# Patient Record
Sex: MALE | Race: OTHER | URBAN - METROPOLITAN AREA
[De-identification: names, ages, dates, MRNs, and addresses within clinical notes are randomized per-mention and may not be internally consistent; named-entity substitution may affect disease eponyms.]

---

## 2022-03-01 ENCOUNTER — OFFICE VISIT (OUTPATIENT)
Dept: FAMILY MEDICINE CLINIC | Facility: CLINIC | Age: 42
End: 2022-03-01
Payer: COMMERCIAL

## 2022-03-01 VITALS
HEIGHT: 62 IN | DIASTOLIC BLOOD PRESSURE: 74 MMHG | TEMPERATURE: 98 F | BODY MASS INDEX: 24.11 KG/M2 | SYSTOLIC BLOOD PRESSURE: 129 MMHG | HEART RATE: 76 BPM | WEIGHT: 131 LBS

## 2022-03-01 DIAGNOSIS — Z23 NEED FOR TDAP VACCINATION: ICD-10-CM

## 2022-03-01 DIAGNOSIS — Z00.00 WELL ADULT EXAM: Primary | ICD-10-CM

## 2022-03-01 PROCEDURE — 99385 PREV VISIT NEW AGE 18-39: CPT | Performed by: FAMILY MEDICINE

## 2022-03-01 PROCEDURE — 90471 IMMUNIZATION ADMIN: CPT | Performed by: FAMILY MEDICINE

## 2022-03-01 PROCEDURE — 3078F DIAST BP <80 MM HG: CPT | Performed by: FAMILY MEDICINE

## 2022-03-01 PROCEDURE — 3074F SYST BP LT 130 MM HG: CPT | Performed by: FAMILY MEDICINE

## 2022-03-01 PROCEDURE — 3008F BODY MASS INDEX DOCD: CPT | Performed by: FAMILY MEDICINE

## 2022-03-01 PROCEDURE — 90715 TDAP VACCINE 7 YRS/> IM: CPT | Performed by: FAMILY MEDICINE

## 2022-08-01 ENCOUNTER — TELEPHONE (OUTPATIENT)
Dept: FAMILY MEDICINE CLINIC | Facility: CLINIC | Age: 42
End: 2022-08-01

## 2022-08-01 NOTE — TELEPHONE ENCOUNTER
Mailed lab reminder letter to pt. Letter came back- unable to deliver due to wrong address per post office. Please update pt's address once he comes back.

## 2024-04-22 ENCOUNTER — OFFICE VISIT (OUTPATIENT)
Dept: FAMILY MEDICINE CLINIC | Facility: CLINIC | Age: 44
End: 2024-04-22

## 2024-04-22 VITALS
DIASTOLIC BLOOD PRESSURE: 82 MMHG | HEART RATE: 93 BPM | OXYGEN SATURATION: 96 % | WEIGHT: 131 LBS | HEIGHT: 63 IN | SYSTOLIC BLOOD PRESSURE: 118 MMHG | TEMPERATURE: 98 F | RESPIRATION RATE: 16 BRPM | BODY MASS INDEX: 23.21 KG/M2

## 2024-04-22 DIAGNOSIS — J20.9 ACUTE BRONCHITIS, UNSPECIFIED ORGANISM: Primary | ICD-10-CM

## 2024-04-22 RX ORDER — BENZONATATE 200 MG/1
200 CAPSULE ORAL 3 TIMES DAILY PRN
Qty: 15 CAPSULE | Refills: 0 | Status: SHIPPED | OUTPATIENT
Start: 2024-04-22 | End: 2024-04-27

## 2024-04-22 NOTE — PROGRESS NOTES
CHIEF COMPLAINT:     Chief Complaint   Patient presents with    Cough     Cough x Fri, worsened by Sat, productive, felt warm at night did not check temp, cough causing HA and chest soreness    OTC Delsym w no relief          HPI:   Osmel Stanley is a 43 year old male who presents for cough for  4  days.   Cough is productive and is worse at night. Cough is interfering with sleep.   Home treatments include: Delsym without relief.   Associated symptoms include: nasal congestion, headache, no fever, no chills, no sore throat, + sinus congestion, no shortness of breath, no wheezing, no chest pain.   No known exposures. Kids with similar symptoms.       Current Outpatient Medications   Medication Sig Dispense Refill    benzonatate 200 MG Oral Cap Take 1 capsule (200 mg total) by mouth 3 (three) times daily as needed for cough. 15 capsule 0    fluocinonide 0.05 % External Cream Apply 1 Application topically 2 (two) times daily. APPLY TO AFFECTED AREA        History reviewed. No pertinent past medical history.   Social History:  Social History     Socioeconomic History    Marital status: Single   Tobacco Use    Smoking status: Never    Smokeless tobacco: Never   Vaping Use    Vaping status: Never Used   Substance and Sexual Activity    Alcohol use: Never    Drug use: Never        REVIEW OF SYSTEMS:   GENERAL: See HPI  SKIN: No rashes, or other skin lesions.   EYES: Denies blurred vision or double vision.  HENT: Denies ear pain or decreased hearing.  See HPI  CARDIOVASCULAR: Denies chest pain or palpitations  LUNGS: Per HPI.    GI: Denies N/V/C/D or abdominal pain.      EXAM:   /82   Pulse 93   Temp 98.3 °F (36.8 °C)   Resp 16   Ht 5' 3\" (1.6 m)   Wt 131 lb (59.4 kg)   SpO2 96%   BMI 23.21 kg/m²   GENERAL: well developed, well nourished,in no apparent distress  SKIN: no rashes, no suspicious lesions  EYES: Conjunctiva clear.    HENT: Atraumatic, normocephalic.  TM's clear bilaterally.  Nostrils patent, nasal  mucosa pink and non-inflamed.  + erythema of the throat.  NECK: supple, non-tender.  LUNGS: Lungs clear throughout. Normal respiratory rate. Normal effort.  Dry cough. No wheezing.   CARDIO: RRR without murmur  LYMPH: No cervical or supraclavicular lymphadenopathy.   EXTREMITIES:  No clubbing, cyanosis, or edema.    ASSESSMENT AND PLAN:   Osmel Stanley is a 43 year old male who presents with:     ASSESSMENT:  Encounter Diagnosis   Name Primary?    Acute bronchitis, unspecified organism Yes       PLAN:   Increase fluids, rest.  Reviewed meds and instructions as below with patient.  Risks, benefits, and side effects of medication explained and discussed.   The patient is asked to seek care if no improvement in 3-4 days or sooner if sx worsen.     Requested Prescriptions     Signed Prescriptions Disp Refills    benzonatate 200 MG Oral Cap 15 capsule 0     Sig: Take 1 capsule (200 mg total) by mouth 3 (three) times daily as needed for cough.             The patient indicates understanding of these issues and agrees to the plan.